# Patient Record
(demographics unavailable — no encounter records)

---

## 2024-10-25 NOTE — HISTORY OF PRESENT ILLNESS
[de-identified] : Ike reports that 3 weeks ago he had a productive cough for discolored mucus, head congestion and laryngitis. His breathing at that time was OK. Since then, his congestion has resolved, voice is back. However, for the past 3 days he has had on and off trouble breathing. Last night was especially tough- needed Albuterol many times. Presently, he reports that his breathing feels tight. He self-administers Xolair Q2 weeks at home- skipped one dose recently due to delivery issues. Also takes Trelegy 200 QD but ran out a few days ago.

## 2024-10-25 NOTE — IMPRESSION
[Spirometry] : Spirometry [FreeTextEntry1] : FEV1=81% of predicted. No significant change post Duoneb however pt reports breathing is easier and improvement noted on auscultation.

## 2024-10-25 NOTE — PHYSICAL EXAM
[Alert] : alert [Healthy Appearance] : healthy appearance [No Acute Distress] : no acute distress [Normal Pupil & Iris Size/Symmetry] : normal pupil and iris size and symmetry [No Discharge] : no discharge [Sclera Not Icteric] : sclera not icteric [Normal TMs] : both tympanic membranes were normal [Normal Nasal Mucosa] : the nasal mucosa was normal [Normal Lips/Tongue] : the lips and tongue were normal [Normal Outer Ear/Nose] : the ears and nose were normal in appearance [No Nasal Discharge] : no nasal discharge [No Thrush] : no thrush [Normal Rate and Effort] : normal respiratory rhythm and effort [No Crackles] : no crackles [No Retractions] : no retractions [Bilateral Audible Breath Sounds] : bilateral audible breath sounds [Wheezing] : wheezing was heard [Normal Rate] : heart rate was normal  [Normal S1, S2] : normal S1 and S2 [No murmur] : no murmur [Regular Rhythm] : with a regular rhythm [Skin Intact] : skin intact  [No Rash] : no rash [No Skin Lesions] : no skin lesions [No Cyanosis] : no cyanosis [Normal Mood] : mood was normal [Normal Affect] : affect was normal [Judgment and Insight Age Appropriate] : judgement and insight is age appropriate

## 2024-10-25 NOTE — ASSESSMENT
[FreeTextEntry1] : Severe persistent asthma is not in control Prednisone 40mg for 6 days Albuterol Q4 PRN Trelegy 200 QD Continue Xolair Q2 weeks - self-administers at home Follow up 1 week